# Patient Record
Sex: FEMALE | URBAN - METROPOLITAN AREA
[De-identification: names, ages, dates, MRNs, and addresses within clinical notes are randomized per-mention and may not be internally consistent; named-entity substitution may affect disease eponyms.]

---

## 2020-07-25 ENCOUNTER — TELEPHONE ENCOUNTER (OUTPATIENT)
Dept: URBAN - METROPOLITAN AREA CLINIC 13 | Facility: CLINIC | Age: 60
End: 2020-07-25

## 2020-07-26 ENCOUNTER — TELEPHONE ENCOUNTER (OUTPATIENT)
Dept: URBAN - METROPOLITAN AREA CLINIC 13 | Facility: CLINIC | Age: 60
End: 2020-07-26

## 2020-07-26 RX ORDER — IPRATROPIUM BROMIDE 42 UG/1
USE TWO SPRAYS IN EACH NOSTRIL THREE TIMES DAILY AS NEEDED FOR CONGEST SPRAY, METERED NASAL
Qty: 15 | Refills: 0 | Status: ACTIVE | COMMUNITY
Start: 2019-02-28

## 2020-07-26 RX ORDER — HYDROCHLOROTHIAZIDE 25 MG/1
TAKE ONE TABLET BY MOUTH ONE TIME DAILY TABLET ORAL
Qty: 90 | Refills: 0 | Status: ACTIVE | COMMUNITY
Start: 2018-09-21

## 2020-07-26 RX ORDER — AMOXICILLIN 500 MG/1
TAKE ONE TABLET BY MOUTH THREE TIMES A DAY UNTIL GONE TABLET, FILM COATED ORAL
Qty: 21 | Refills: 0 | Status: ACTIVE | COMMUNITY
Start: 2018-11-14

## 2020-07-26 RX ORDER — AZITHROMYCIN DIHYDRATE 250 MG/1
TAKE TWO TABLETS BY MOUTH ON DAY 1, THEN TAKE ONE TABLET ONE TIME DAIL TABLET, FILM COATED ORAL
Qty: 6 | Refills: 0 | Status: ACTIVE | COMMUNITY
Start: 2018-11-18

## 2020-07-26 RX ORDER — HYDROCODONE BITARTRATE AND ACETAMINOPHEN 5; 325 MG/1; MG/1
TAKE ONE TABLET BY MOUTH EVERY 6 HOURS AS NEEDED FOR SEVERE PAIN TABLET ORAL
Qty: 20 | Refills: 0 | Status: ACTIVE | COMMUNITY
Start: 2018-11-19

## 2020-07-26 RX ORDER — METHYLPREDNISOLONE 4 MG/1
TAKE AS DIRECTED TABLET ORAL
Qty: 21 | Refills: 0 | Status: ACTIVE | COMMUNITY
Start: 2018-11-09

## 2022-03-22 ENCOUNTER — ESTABLISHED PATIENT (OUTPATIENT)
Dept: URBAN - METROPOLITAN AREA CLINIC 20 | Facility: CLINIC | Age: 62
End: 2022-03-22

## 2022-03-22 ENCOUNTER — WEB ENCOUNTER (OUTPATIENT)
Dept: URBAN - METROPOLITAN AREA CLINIC 113 | Facility: CLINIC | Age: 62
End: 2022-03-22

## 2022-03-22 DIAGNOSIS — H10.13: ICD-10-CM

## 2022-03-22 DIAGNOSIS — H16.223: ICD-10-CM

## 2022-03-22 PROCEDURE — 92014 COMPRE OPH EXAM EST PT 1/>: CPT

## 2022-03-22 ASSESSMENT — KERATOMETRY
OS_AXISANGLE2_DEGREES: 122
OS_AXISANGLE_DEGREES: 32
OD_AXISANGLE_DEGREES: 161
OD_K2POWER_DIOPTERS: 43.50
OS_K2POWER_DIOPTERS: 43.50
OD_AXISANGLE2_DEGREES: 71
OD_K1POWER_DIOPTERS: 43.00
OS_K1POWER_DIOPTERS: 42.75

## 2022-03-22 ASSESSMENT — TONOMETRY
OS_IOP_MMHG: 20
OD_IOP_MMHG: 17

## 2022-03-22 ASSESSMENT — VISUAL ACUITY
OU_CC: J1+
OS_CC: 20/25-2
OD_CC: 20/25-2

## 2022-03-22 NOTE — PATIENT DISCUSSION
OTC antihistamine drops/tablets. START: PATADAY 1 DROP ONCE A DAY IN [facility] EYE.

## 2022-04-01 ENCOUNTER — DASHBOARD ENCOUNTERS (OUTPATIENT)
Age: 62
End: 2022-04-01

## 2022-04-01 ENCOUNTER — LAB OUTSIDE AN ENCOUNTER (OUTPATIENT)
Dept: URBAN - METROPOLITAN AREA CLINIC 72 | Facility: CLINIC | Age: 62
End: 2022-04-01

## 2022-04-01 ENCOUNTER — OFFICE VISIT (OUTPATIENT)
Dept: URBAN - METROPOLITAN AREA CLINIC 72 | Facility: CLINIC | Age: 62
End: 2022-04-01
Payer: COMMERCIAL

## 2022-04-01 VITALS
WEIGHT: 260 LBS | SYSTOLIC BLOOD PRESSURE: 145 MMHG | TEMPERATURE: 98.4 F | BODY MASS INDEX: 37.22 KG/M2 | HEIGHT: 70 IN | RESPIRATION RATE: 20 BRPM | DIASTOLIC BLOOD PRESSURE: 90 MMHG | HEART RATE: 105 BPM

## 2022-04-01 DIAGNOSIS — K30 INDIGESTION: ICD-10-CM

## 2022-04-01 DIAGNOSIS — R05.9 COUGH: ICD-10-CM

## 2022-04-01 DIAGNOSIS — Z80.0 FAMILY HISTORY OF GASTRIC CANCER: ICD-10-CM

## 2022-04-01 DIAGNOSIS — Z12.11 COLON CANCER SCREENING: ICD-10-CM

## 2022-04-01 PROCEDURE — 99204 OFFICE O/P NEW MOD 45 MIN: CPT | Performed by: INTERNAL MEDICINE

## 2022-04-01 RX ORDER — FLUOROMETHOLONE 1 MG/ML
1 DROP INTO AFFECTED EYE SOLUTION/ DROPS OPHTHALMIC TWICE A DAY
Status: ACTIVE | COMMUNITY

## 2022-04-01 RX ORDER — ALBUTEROL SULFATE 90 UG/1
1 PUFF AS NEEDED AEROSOL, METERED RESPIRATORY (INHALATION)
Status: ACTIVE | COMMUNITY

## 2022-04-01 RX ORDER — HYDROCHLOROTHIAZIDE 25 MG/1
TAKE ONE TABLET BY MOUTH ONE TIME DAILY TABLET ORAL
Qty: 90 | Refills: 0 | Status: ACTIVE | COMMUNITY
Start: 2018-09-21

## 2022-04-01 RX ORDER — VIT C/E/ZN/COP/LUT/ZEA/BIO/SAF 125-100-20
AS DIRECTED CAPSULE ORAL
Status: ACTIVE | COMMUNITY

## 2022-04-01 RX ORDER — AZITHROMYCIN DIHYDRATE 250 MG/1
TAKE TWO TABLETS BY MOUTH ON DAY 1, THEN TAKE ONE TABLET ONE TIME DAIL TABLET, FILM COATED ORAL
Qty: 6 | Refills: 0 | Status: DISCONTINUED | COMMUNITY
Start: 2018-11-18

## 2022-04-01 RX ORDER — METHYLPREDNISOLONE 4 MG/1
TAKE AS DIRECTED TABLET ORAL
Qty: 21 | Refills: 0 | Status: DISCONTINUED | COMMUNITY
Start: 2018-11-09

## 2022-04-01 RX ORDER — AMOXICILLIN 500 MG/1
TAKE ONE TABLET BY MOUTH THREE TIMES A DAY UNTIL GONE TABLET, FILM COATED ORAL
Qty: 21 | Refills: 0 | Status: DISCONTINUED | COMMUNITY
Start: 2018-11-14

## 2022-04-01 RX ORDER — HYDROCODONE BITARTRATE AND ACETAMINOPHEN 5; 325 MG/1; MG/1
TAKE ONE TABLET BY MOUTH EVERY 6 HOURS AS NEEDED FOR SEVERE PAIN TABLET ORAL
Qty: 20 | Refills: 0 | Status: DISCONTINUED | COMMUNITY
Start: 2018-11-19

## 2022-04-01 RX ORDER — IPRATROPIUM BROMIDE 42 UG/1
USE TWO SPRAYS IN EACH NOSTRIL THREE TIMES DAILY AS NEEDED FOR CONGEST SPRAY, METERED NASAL
Qty: 15 | Refills: 0 | Status: ACTIVE | COMMUNITY
Start: 2019-02-28

## 2022-04-01 NOTE — HPI-TODAY'S VISIT:
Mrs. Frankel is a pleasant 61-year-old female who presents as a new patient for evaluation for colonoscopy as well as consideration of upper endoscopy for uncontrolled reflux, recurrent thrush and cough.  She was referred by Dr. Alyse Regan.  She has a family history of gastric cancer in her mother.  She also has past history of hypertension  She reports she has had long-term suffering of postprandial cough.  She does have asthma and uses inhalers.  She is going to see a lung specialist later this year.  She has never had a colonoscopy.  She has tried some dietary and lifestyle modifications seem to help with the cough, unsure if she had any benefit.  She did try over-the-counter acid suppression medication with minimal improvement.  She denies any dysphagia but does have productive cough at times.  Has family history of gastric cancer.

## 2022-05-23 ENCOUNTER — TELEPHONE ENCOUNTER (OUTPATIENT)
Dept: URBAN - METROPOLITAN AREA CLINIC 72 | Facility: CLINIC | Age: 62
End: 2022-05-23

## 2022-06-01 ENCOUNTER — WEB ENCOUNTER (OUTPATIENT)
Dept: URBAN - METROPOLITAN AREA CLINIC 72 | Facility: CLINIC | Age: 62
End: 2022-06-01

## 2022-06-07 ENCOUNTER — ESTABLISHED PATIENT (OUTPATIENT)
Dept: URBAN - METROPOLITAN AREA CLINIC 20 | Facility: CLINIC | Age: 62
End: 2022-06-07

## 2022-06-07 DIAGNOSIS — H52.4: ICD-10-CM

## 2022-06-07 DIAGNOSIS — H16.223: ICD-10-CM

## 2022-06-07 PROCEDURE — 92015 DETERMINE REFRACTIVE STATE: CPT

## 2022-06-07 PROCEDURE — 92012 INTRM OPH EXAM EST PATIENT: CPT

## 2022-06-07 ASSESSMENT — VISUAL ACUITY
OD_CC: 20/25-3
OU_CC: J1+
OS_CC: 20/25-2

## 2022-06-07 ASSESSMENT — KERATOMETRY
OS_K1POWER_DIOPTERS: 42.75
OS_AXISANGLE_DEGREES: 31
OS_AXISANGLE2_DEGREES: 121
OD_AXISANGLE2_DEGREES: 67
OS_K2POWER_DIOPTERS: 43.50
OD_K1POWER_DIOPTERS: 43.00
OD_AXISANGLE_DEGREES: 157
OD_K2POWER_DIOPTERS: 43.25

## 2022-06-07 ASSESSMENT — TONOMETRY
OS_IOP_MMHG: 17
OD_IOP_MMHG: 15

## 2022-06-24 ENCOUNTER — OFFICE VISIT (OUTPATIENT)
Dept: URBAN - METROPOLITAN AREA MEDICAL CENTER 40 | Facility: MEDICAL CENTER | Age: 62
End: 2022-06-24

## 2022-07-08 ENCOUNTER — OFFICE VISIT (OUTPATIENT)
Dept: URBAN - METROPOLITAN AREA CLINIC 72 | Facility: CLINIC | Age: 62
End: 2022-07-08

## 2022-07-22 PROBLEM — 275108004: Status: ACTIVE | Noted: 2022-07-22

## 2022-07-22 PROBLEM — 305058001: Status: ACTIVE | Noted: 2022-07-22

## 2022-07-22 PROBLEM — 49727002 COUGH: Status: ACTIVE | Noted: 2022-07-22

## 2022-07-22 PROBLEM — 162031009: Status: ACTIVE | Noted: 2022-04-01

## 2022-08-19 ENCOUNTER — TELEPHONE ENCOUNTER (OUTPATIENT)
Dept: URBAN - METROPOLITAN AREA CLINIC 113 | Facility: CLINIC | Age: 62
End: 2022-08-19

## 2022-09-09 ENCOUNTER — OFFICE VISIT (OUTPATIENT)
Dept: URBAN - METROPOLITAN AREA MEDICAL CENTER 40 | Facility: MEDICAL CENTER | Age: 62
End: 2022-09-09

## 2022-09-23 ENCOUNTER — OFFICE VISIT (OUTPATIENT)
Dept: URBAN - METROPOLITAN AREA CLINIC 72 | Facility: CLINIC | Age: 62
End: 2022-09-23

## 2023-06-08 ENCOUNTER — ESTABLISHED PATIENT (OUTPATIENT)
Dept: URBAN - METROPOLITAN AREA CLINIC 20 | Facility: CLINIC | Age: 63
End: 2023-06-08

## 2023-06-08 DIAGNOSIS — H25.813: ICD-10-CM

## 2023-06-08 DIAGNOSIS — H52.4: ICD-10-CM

## 2023-06-08 DIAGNOSIS — H16.223: ICD-10-CM

## 2023-06-08 PROCEDURE — 99214 OFFICE O/P EST MOD 30 MIN: CPT

## 2023-06-08 ASSESSMENT — KERATOMETRY
OS_K2POWER_DIOPTERS: 43.50
OD_AXISANGLE2_DEGREES: 72
OS_AXISANGLE_DEGREES: 17
OS_AXISANGLE2_DEGREES: 121
OS_AXISANGLE2_DEGREES: 107
OD_AXISANGLE_DEGREES: 162
OD_K2POWER_DIOPTERS: 43.50
OD_K1POWER_DIOPTERS: 43.00
OD_AXISANGLE_DEGREES: 157
OD_K2POWER_DIOPTERS: 43.25
OS_AXISANGLE_DEGREES: 31
OS_K1POWER_DIOPTERS: 42.75
OS_K1POWER_DIOPTERS: 43.00
OD_AXISANGLE2_DEGREES: 67

## 2023-06-08 ASSESSMENT — VISUAL ACUITY
OD_CC: 20/25-1
OU_CC: J1+
OU_CC: 20/20-2
OS_CC: 20/20-1

## 2023-06-08 ASSESSMENT — TONOMETRY
OD_IOP_MMHG: 18
OS_IOP_MMHG: 18

## 2024-06-05 ENCOUNTER — ESTABLISHED PATIENT (OUTPATIENT)
Dept: URBAN - METROPOLITAN AREA CLINIC 20 | Facility: CLINIC | Age: 64
End: 2024-06-05

## 2024-06-05 DIAGNOSIS — H16.223: ICD-10-CM

## 2024-06-05 PROCEDURE — 92014 COMPRE OPH EXAM EST PT 1/>: CPT

## 2024-06-05 ASSESSMENT — KERATOMETRY
OS_K1POWER_DIOPTERS: 43.00
OS_AXISANGLE2_DEGREES: 107
OS_AXISANGLE_DEGREES: 31
OD_AXISANGLE_DEGREES: 157
OD_AXISANGLE_DEGREES: 162
OS_AXISANGLE2_DEGREES: 121
OD_AXISANGLE2_DEGREES: 72
OD_K1POWER_DIOPTERS: 43.00
OD_AXISANGLE2_DEGREES: 67
OS_K1POWER_DIOPTERS: 42.75
OD_K2POWER_DIOPTERS: 43.25
OS_AXISANGLE_DEGREES: 17
OS_K2POWER_DIOPTERS: 43.50
OD_K2POWER_DIOPTERS: 43.50

## 2024-06-05 ASSESSMENT — VISUAL ACUITY
OU_CC: 20/20-2
OS_CC: 20/25-1
OD_CC: 20/25+2

## 2024-06-05 ASSESSMENT — TONOMETRY
OS_IOP_MMHG: 14
OD_IOP_MMHG: 15